# Patient Record
Sex: MALE | Race: BLACK OR AFRICAN AMERICAN | Employment: FULL TIME | URBAN - METROPOLITAN AREA
[De-identification: names, ages, dates, MRNs, and addresses within clinical notes are randomized per-mention and may not be internally consistent; named-entity substitution may affect disease eponyms.]

---

## 2019-08-22 ENCOUNTER — HOSPITAL ENCOUNTER (EMERGENCY)
Age: 29
Discharge: HOME | End: 2019-08-22
Attending: EMERGENCY MEDICINE

## 2019-08-22 ENCOUNTER — APPOINTMENT (OUTPATIENT)
Dept: GENERAL RADIOLOGY | Age: 29
End: 2019-08-22

## 2019-08-22 VITALS
DIASTOLIC BLOOD PRESSURE: 61 MMHG | TEMPERATURE: 98.4 F | SYSTOLIC BLOOD PRESSURE: 119 MMHG | OXYGEN SATURATION: 100 % | BODY MASS INDEX: 26.79 KG/M2 | HEART RATE: 66 BPM | RESPIRATION RATE: 18 BRPM | WEIGHT: 220 LBS | HEIGHT: 76 IN

## 2019-08-22 DIAGNOSIS — G89.29 CHRONIC PAIN OF RIGHT KNEE: Primary | ICD-10-CM

## 2019-08-22 DIAGNOSIS — M25.561 CHRONIC PAIN OF RIGHT KNEE: Primary | ICD-10-CM

## 2019-08-22 PROCEDURE — 73562 X-RAY EXAM OF KNEE 3: CPT

## 2019-08-22 PROCEDURE — 99283 EMERGENCY DEPT VISIT LOW MDM: CPT

## 2019-08-22 ASSESSMENT — PAIN DESCRIPTION - DESCRIPTORS: DESCRIPTORS: ACHING;STABBING;SHARP

## 2019-08-22 ASSESSMENT — PAIN DESCRIPTION - LOCATION: LOCATION: KNEE

## 2019-08-22 ASSESSMENT — PAIN SCALES - GENERAL: PAINLEVEL_OUTOF10: 10

## 2019-08-22 ASSESSMENT — PAIN DESCRIPTION - PAIN TYPE: TYPE: ACUTE PAIN

## 2019-08-22 ASSESSMENT — PAIN DESCRIPTION - ORIENTATION: ORIENTATION: RIGHT

## 2019-08-23 ASSESSMENT — ENCOUNTER SYMPTOMS: BACK PAIN: 0

## 2019-08-24 NOTE — ED PROVIDER NOTES
of this dictation. EMERGENCY DEPARTMENT COURSE and DIFFERENTIAL DIAGNOSIS/MDM:   Vitals:    Vitals:    08/22/19 1706   BP: 119/61   Pulse: 66   Resp: 18   Temp: 98.4 °F (36.9 °C)   TempSrc: Oral   SpO2: 100%   Weight: 220 lb (99.8 kg)   Height: 6' 4\" (1.93 m)           Medical Decision Making patient has chronic right knee pain with acute exacerbation. There is no redness no warmth no swelling. He is able to bear weight. X-rays show no acute findings. Patient is discharged home in stable condition to follow-up with orthopedics. Ace wrap offered and declined. Motrin as needed for pain. CONSULTS:  None    PROCEDURES:  None    FINAL IMPRESSION      1. Chronic pain of right knee          DISPOSITION/PLAN   DISPOSITION Decision To Discharge 08/22/2019 06:32:45 PM      PATIENT REFERRED TO:   No follow-up provider specified. DISCHARGE MEDICATIONS:   There are no discharge medications for this patient.         (Please note that portions of this note were completed with a voice recognition program.  Efforts were made to edit the dictations but occasionally words are mis-transcribed.)    Ean ESPINOZA PA-C  Attending Emergency Physician         Marguerite Anders PA-C  08/23/19 [id number]

## 2022-12-01 ENCOUNTER — HOSPITAL ENCOUNTER (EMERGENCY)
Dept: HOSPITAL 47 - EC | Age: 32
Discharge: HOME | End: 2022-12-01
Payer: COMMERCIAL

## 2022-12-01 VITALS
RESPIRATION RATE: 19 BRPM | DIASTOLIC BLOOD PRESSURE: 75 MMHG | SYSTOLIC BLOOD PRESSURE: 119 MMHG | TEMPERATURE: 98.3 F | HEART RATE: 73 BPM
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
## 2022-12-01 DIAGNOSIS — S93.421A: Primary | ICD-10-CM
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
## 2022-12-01 DIAGNOSIS — F32.A: ICD-10-CM

## 2022-12-01 PROCEDURE — 29515 APPLICATION SHORT LEG SPLINT: CPT

## 2022-12-01 PROCEDURE — 99283 EMERGENCY DEPT VISIT LOW MDM: CPT

## 2022-12-01 NOTE — ED
Lower Extremity Injury HPI





- General


Chief Complaint: Extremity Injury, Lower


Stated Complaint: right ankle injury, [facility]


Time Seen by Provider: 12/01/22 02:21


Source: patient, RN notes reviewed


Mode of arrival: ambulatory





- History of Present Illness


Initial Comments: 





This is a pleasant 32-year-old male who slipped on a wet floor at work and 

twisted his right ankle.  Complaining of pain to the medial aspect of the ankle 

which is exacerbated by ambulation and movement.  No other injuries.





No headache, no fever or chills, no changes in vision or hearing, no sore throat

or difficulty with speech, no neck pain, no chest pain or shortness of breath, 

no abdominal pain, no nausea or vomiting, no changes in urination or bowel 

movements, no numbness or tingling, no skin rashes or lesions.





Past medical, surgical, social, and family history reviewed.


MD Complaint: ankle injury





- Related Data


                                    Allergies











Allergy/AdvReac Type Severity Reaction Status Date / Time


 


No Known Allergies Allergy   Verified 12/01/22 01:13














Review of Systems


ROS Statement: 


Those systems with pertinent positive or pertinent negative responses have been 

documented in the HPI.





ROS Other: All systems not noted in ROS Statement are negative.





Past Medical History


Past Medical History: No Reported History


History of Any Multi-Drug Resistant Organisms: None Reported


Past Surgical History: No Surgical Hx Reported


Past Psychological History: Depression


Smoking Status: Never smoker


Past Alcohol Use History: None Reported


Past Drug Use History: None Reported





General Exam


General appearance: alert, in no apparent distress


Head exam: Present: atraumatic, normocephalic, normal inspection


Eye exam: Present: normal appearance, PERRL, EOMI.  Absent: scleral icterus, 

conjunctival injection, periorbital swelling


ENT exam: Present: normal exam, normal oropharynx, mucous membranes dry, mucous 

membranes moist, normal external ear exam


Neck exam: Present: normal inspection.  Absent: tenderness, meningismus, 

lymphadenopathy


Respiratory exam: Present: normal lung sounds bilaterally.  Absent: respiratory 

distress, wheezes, rales, rhonchi, stridor


Cardiovascular Exam: Present: regular rate, normal rhythm, normal heart sounds. 

Absent: systolic murmur, diastolic murmur, rubs, gallop, clicks


GI/Abdominal exam: Present: soft.  Absent: distended, tenderness, guarding, 

rebound, rigid


Extremities exam: Present: normal inspection, full ROM, tenderness, normal 

capillary refill.  Absent: pedal edema, joint swelling, calf tenderness


  ** Right


Knee exam: Present: normal inspection, full ROM.  Absent: tenderness


Lower Leg exam: Present: normal inspection.  Absent: tenderness


Ankle exam: Present: full ROM (With pain), tenderness (Over the deltoid ligamen

t).  Absent: swelling, abrasion, laceration, ecchymosis, crepitus, dislocation, 

erythema, anterior draw sign


Foot/Toe exam: Present: normal inspection, full ROM.  Absent: tenderness, 

abrasion, laceration, ecchymosis, deformity, crepitus, dislocation, erythema, 

amputation


Neurovascular tendon exam: Present: no vascular compromise.  Absent: pulse def

icit, abnormal cap refill, motor deficit, pallor


Back exam: Present: normal inspection


Neurological exam: Present: alert, oriented X3, CN II-XII intact


Psychiatric exam: Present: normal affect, normal mood


Skin exam: Present: warm, dry, intact, normal color.  Absent: rash





Course


                                   Vital Signs











  12/01/22





  01:11


 


Temperature 98.3 F


 


Pulse Rate 73


 


Respiratory 19





Rate 


 


Blood Pressure 119/75


 


O2 Sat by Pulse 97





Oximetry 














Medical Decision Making





- Medical Decision Making





Plain film x-rays interpreted by me reveal no acute pathology.  Concurs with 

radiology interpretation





Prefabricated ankle stirrup splint applied by me.  Distal neurovascular status 

intact both pre-and post-application.





Isolated right ankle injury, deltoid ligament sprain.





Patient was told to return to the ER for any signs or symptoms worsen.  Told to 

return immediately if any other problems arise.  All questions answered.  

Treatment plan discussed.  Patient in agreement


Every effort has been made to ensure accuracy of this dictation.  However, due 

to the limitations of electronic medical records and dictation devices, errors 

in charting still occur.





Patient will need to follow up with [facility], sitting work only until reevaluation.





Discussed conservative therapy, discussed RICE therapy.





Supervising physician Dr. Napoles





- Radiology Data


Radiology results: report reviewed, image reviewed





Disposition


Clinical Impression: 


 Sprain of deltoid ligament of right ankle, initial encounter





Disposition: HOME SELF-CARE


Condition: Good


Instructions (If sedation given, give patient instructions):  Ankle Sprain (ED),

Ankle Stirrup Splint (ED)


Additional Instructions: 


Follow-up with [facility].  Return to the ER immediately if any symptoms worsen, new 

symptoms arise, or any other problems develop.


Is patient prescribed a controlled substance at d/c from ED?: No


Referrals: 


None,Stated [Primary Care Provider] - 1-2 days


Time of Disposition: 02:32

## 2022-12-01 NOTE — XR
EXAMINATION TYPE: XR ankle complete RT

 

DATE OF EXAM: 12/1/2022

 

COMPARISON: NONE

 

HISTORY: Fall. Pain

 

TECHNIQUE: 3 views

 

FINDINGS: Ankle mortise is anatomic. I see no fracture nor dislocation. Joint spaces are normal.

 

IMPRESSION: Negative right ankle exam. No fracture seen.

## 2022-12-02 ENCOUNTER — HOSPITAL ENCOUNTER (OUTPATIENT)
Dept: HOSPITAL 47 - RADXRMAIN | Age: 32
Discharge: HOME | End: 2022-12-02
Attending: EMERGENCY MEDICINE
Payer: COMMERCIAL

## 2022-12-02 DIAGNOSIS — S93.601A: Primary | ICD-10-CM

## 2022-12-02 NOTE — XR
EXAMINATION TYPE: XR foot complete RT

 

DATE OF EXAM: 12/2/2022

 

COMPARISON: NONE

 

HISTORY: 32-year-old male right foot twisting injury after slip and fall, pain.

 

TECHNIQUE: 3 views

 

FINDINGS:

Early mild degenerative spurring at the first MTP joint. No acute fracture, subluxation, dislocation 
seen. Joint spaces throughout are maintained.

 

 

IMPRESSION:

No acute osseous abnormality seen.

## 2022-12-13 ENCOUNTER — HOSPITAL ENCOUNTER (OUTPATIENT)
Dept: HOSPITAL 47 - RADXRMAIN | Age: 32
Discharge: HOME | End: 2022-12-13
Attending: EMERGENCY MEDICINE
Payer: COMMERCIAL

## 2022-12-13 DIAGNOSIS — S93.401D: ICD-10-CM

## 2022-12-13 DIAGNOSIS — S93.601D: Primary | ICD-10-CM

## 2022-12-13 NOTE — XR
EXAMINATION TYPE: XR ankle complete RT, XR foot complete RT

 

DATE OF EXAM: 12/13/2022

 

CLINICAL HISTORY: Recent injury with continued pain.

 

TECHNIQUE:  Frontal, lateral and oblique images of the right ankle and foot are obtained.

 

COMPARISON: Prior right ankle and foot xray December 1 and December 2, 2022.

 

FINDINGS:  There is no acute fracture/dislocation evident in the right ankle.  The ankle mortise appe
ars stable and within normal limits.  The overlying soft tissue appears unremarkable.

 

There is no acute fracture or dislocation evident in the right foot.  The joint spaces in the right f
oot are stable and satisfactory.  Overlying soft tissue remains unremarkable.  

 

IMPRESSION:  There is no acute fracture or dislocation in the right ankle or foot. No significant davis
nge from recent studies.

## 2023-03-08 ENCOUNTER — HOSPITAL ENCOUNTER (EMERGENCY)
Dept: HOSPITAL 47 - EC | Age: 33
Discharge: HOME | End: 2023-03-08
Payer: COMMERCIAL

## 2023-03-08 VITALS — HEART RATE: 76 BPM | DIASTOLIC BLOOD PRESSURE: 73 MMHG | RESPIRATION RATE: 18 BRPM | SYSTOLIC BLOOD PRESSURE: 120 MMHG

## 2023-03-08 VITALS — TEMPERATURE: 97.8 F

## 2023-03-08 DIAGNOSIS — Z20.822: ICD-10-CM

## 2023-03-08 DIAGNOSIS — J40: Primary | ICD-10-CM

## 2023-03-08 PROCEDURE — 71046 X-RAY EXAM CHEST 2 VIEWS: CPT

## 2023-03-08 PROCEDURE — 87636 SARSCOV2 & INF A&B AMP PRB: CPT

## 2023-03-08 PROCEDURE — 99285 EMERGENCY DEPT VISIT HI MDM: CPT

## 2023-03-08 PROCEDURE — 93005 ELECTROCARDIOGRAM TRACING: CPT

## 2023-03-08 NOTE — ED
URI HPI





- General


Chief Complaint: Upper Respiratory Infection


Stated Complaint: Cough, Difficulty Breathing


Time Seen by Provider: 03/08/23 05:02


Source: patient


Mode of arrival: ambulatory


Limitations: no limitations





- History of Present Illness


Initial Comments: 


32-year-old male presents emergency department reporting shortness of breath.  

States for the past couple of days he has had cough, congestion, sore throat.  

Reports that his cough is mildly productive.  Does have sick contacts with 

similar symptoms.  Admits to fevers with chills.  Admits to nausea without 

vomiting.  He is taking DayQuil for his symptoms without any relief.  No 

previous history of underlying lung disease.  He does smoke marijuana and vape. 

He denies any chest pain.  No family history of sudden cardiac death.  No 

alleviating, precipitating or modifying factors








- Related Data


                                  Previous Rx's











 Medication  Instructions  Recorded


 


Albuterol Inhaler [Ventolin Hfa 1 - 2 puff INHALATION Q6H PRN #1 03/08/23





Inhaler] unit 


 


Azithromycin [Zithromax Z Pack] 1 tab PO AS DIRECTED #6 tab 03/08/23


 


guaiFENesin-Coden 100-10MG/5ML 5 ml PO Q6H PRN 3 Days #60 ml 03/08/23





[Robitussin AC]  


 


predniSONE [Deltasone] 20 mg PO BID #10 tab 03/08/23











                                    Allergies











Allergy/AdvReac Type Severity Reaction Status Date / Time


 


No Known Allergies Allergy   Verified 12/01/22 01:13














Review of Systems


ROS Statement: 


Those systems with pertinent positive or pertinent negative responses have been 

documented in the HPI.





ROS Other: All systems not noted in ROS Statement are negative.





Past Medical History


Past Medical History: No Reported History


History of Any Multi-Drug Resistant Organisms: None Reported


Past Surgical History: No Surgical Hx Reported


Past Psychological History: Depression


Smoking Status: Never smoker


Past Alcohol Use History: None Reported


Past Drug Use History: None Reported





General Exam


Limitations: no limitations


General appearance: alert, in no apparent distress


Head exam: Present: atraumatic, normocephalic, normal inspection


Eye exam: Present: normal appearance, PERRL, EOMI.  Absent: scleral icterus, 

conjunctival injection, periorbital swelling


ENT exam: Present: normal exam, mucous membranes moist, other (nasal congestion)


Neck exam: Present: normal inspection.  Absent: tenderness, meningismus, 

lymphadenopathy


Respiratory exam: Present: normal lung sounds bilaterally.  Absent: respiratory 

distress, wheezes, rales, rhonchi, stridor


Cardiovascular Exam: Present: regular rate, normal rhythm, normal heart sounds. 

 Absent: systolic murmur, diastolic murmur, rubs, gallop, clicks


GI/Abdominal exam: Present: soft, normal bowel sounds.  Absent: distended, 

tenderness, guarding, rebound, rigid


Extremities exam: Present: normal inspection, full ROM, normal capillary refill.

  Absent: tenderness, pedal edema, joint swelling, calf tenderness


Back exam: Present: normal inspection


Neurological exam: Present: alert, oriented X3, CN II-XII intact


Psychiatric exam: Present: normal affect, normal mood


Skin exam: Present: warm, dry, intact, normal color.  Absent: rash





Course


                                   Vital Signs











  03/08/23 03/08/23 03/08/23





  03:57 04:39 04:52


 


Temperature 98.3 F 97.8 F 


 


Pulse Rate 88 85 


 


Respiratory 20 14 22





Rate   


 


Blood Pressure 115/79 127/77 


 


O2 Sat by Pulse 98 97 





Oximetry   














  03/08/23





  06:20


 


Temperature 


 


Pulse Rate 76


 


Respiratory 18





Rate 


 


Blood Pressure 120/73


 


O2 Sat by Pulse 99





Oximetry 














Medical Decision Making





- Medical Decision Making





Was pt. sent in by a medical professional or institution (, PA, NP, urgent 

care, hospital, or nursing home...) When possible be specific


@  -No


Did you speak to anyone other than the patient for history (EMS, parent, family,

 police, friend...)? What history was obtained from this source 


@  -No


Did you review nursing and triage notes (agree or disagree)?  Why? 


@  -I reviewed and agree with nursing and triage notes


Were old charts reviewed (outside hosp., previous admission, EMS record, old 

EKG, old radiological studies, urgent care reports/EKG's, nursing home records)?

 Report findings 


@  -No old charts were reviewed


Differential Diagnosis (chest pain, altered mental status, abdominal pain women,

 abdominal pain men, vaginal bleeding, weakness, fever, dyspnea, syncope, 

headache, dizziness, GI bleed, back pain, seizure, CVA, palpatations, mental 

health, musculoskeletal)? 


@  -Covid, influenza, bronchitis, URI


EKG interpreted by me (3pts min.).


@  -Not done


X-rays interpreted by me (1pt min.).


@  -Yes


CT interpreted by me (1pt min.).


@  -None done


U/S interpreted by me (1pt. min.).


@  -None done


What testing was considered but not performed or refused? (CT, X-rays, U/S, 

labs)? Why?


@  -None


What meds were considered but not given or refused? Why?


@  -None


Did you discuss the management of the patient with other professionals 

(professionals i.e. DrAditya, PA, NP, lab, RT, psych nurse, , , 

teacher, , )? Give summary


@  -No


Was smoking cessation discussed for >3mins.?


@  -Yes


Was critical care preformed (if so, how long)?


@  -No


Were there social determinants of health that impacted care today? How? 

(Homelessness, low income, unemployed, alcoholism, drug addiction, 

transportation, low edu. Level, literacy, decrease access to med. care, correction, 

rehab)?


@  -No


Was there de-escalation of care discussed even if they declined (Discuss DNR or 

withdrawal of care, Hospice)? DNR status


@  -No


What co-morbidities impacted this encounter? (DM, HTN, Smoking, COPD, CAD, 

Cancer, CVA, ARF, Chemo, Hep., AIDS, mental health diagnosis, sleep apnea, 

morbid obesity)?


@  -None


Was patient admitted / discharged? Hospital course, mention meds given and 

route, prescriptions, significant lab abnormalities, going to OR and other 

pertinent info.


@  - Arrival patient is placed into room 7.  History and physical exam was 

performed.  12-lead EKG is obtained.  Patient is swabbed for influenza and 

Covid.  Chest x-rays performed.  Upon return results they are discussed with the

 patient.  Patient's clinical symptoms are consistent with bronchitis at this 

time.  We'll treat with an inhaler, steroids and codeine cough syrup.  Patient 

is to use these medications as directed up with primary care doctor in 2-4 days.

  Return for any new or worsening symptoms.  Patient was agreeable to treatment 

plan and is discharged home in stable condition


Undiagnosed new problem with uncertain prognosis?


@  -Yes


Drug Therapy requiring intensive monitoring for toxicity (Heparin, Nitro, 

Insulin, Cardizem)?


@  -No


Were any procedures done?


@  -No


Diagnosis/symptom?


@  -acute cough, acute bronchitis


Acute, or Chronic, or Acute on Chronic?


@  -acute


Uncomplicated (without systemic symptoms) or Complicated (systemic symptoms)?


@  -uncomplicated


Side effects of treatment?


@  -Allergic reaction


Exacerbation, Progression, or Severe Exacerbation?


@  -No


Poses a threat to life or bodily function? How? (Chest pain, USA, MI, pneumonia,

 PE, COPD, DKA, ARF, appy, cholecystitis, CVA, Diverticulitis, Homicidal, 

Suicidal, threat to staff... and all critical care pts)


@  -No





- Lab Data


                                   Lab Results











  03/08/23 Range/Units





  04:35 


 


Influenza Type A (PCR)  Not Detected  (Not Detectd)  


 


Influenza Type B (PCR)  Not Detected  (Not Detectd)  


 


RSV (PCR)  Not Detected  (Not Detectd)  


 


SARS-CoV-2 (PCR)  Not Detected  (Not Detectd)  














- EKG Data


EKG Comments: 


EKG demonstrates sinus rhythm with a rate of 67. SD interval 194.  QRS 91.  QTC 

of 419.  Inverted T-wave lead 3.  No acute ST segment elevations.





Disposition


Clinical Impression: 


 Bronchitis





Disposition: HOME SELF-CARE


Condition: Stable


Instructions (If sedation given, give patient instructions):  Upper Respiratory 

Infection (ED)


Additional Instructions: 


Please started taking the prednisone tomorrow.  You may use the inhaler and 

cough syrup today.  Follow-up with your doctor in 2-4 days and return for any 

new or worsening symptoms


Prescriptions: 


predniSONE [Deltasone] 20 mg PO BID #10 tab


guaiFENesin-Coden 100-10MG/5ML [Robitussin AC] 5 ml PO Q6H PRN 3 Days #60 ml


 PRN Reason: Cough


Albuterol Inhaler [Ventolin Hfa Inhaler] 1 - 2 puff INHALATION Q6H PRN #1 unit


 PRN Reason: Shortness Of Breath


Azithromycin [Zithromax Z Pack] 1 tab PO AS DIRECTED #6 tab


Is patient prescribed a controlled substance at d/c from ED?: No


Referrals: 


None,Stated [Primary Care Provider] - 1-2 days


Time of Disposition: 06:25

## 2023-03-08 NOTE — XR
EXAMINATION TYPE: XR chest 2V

 

DATE OF EXAM: 3/8/2023

 

COMPARISON: NONE

 

HISTORY: Cough

 

TECHNIQUE: 2 views

 

FINDINGS: Heart and mediastinum are normal. Lungs are clear. Diaphragm is normal. Bony thorax appears
 normal.

 

IMPRESSION: Normal chest.